# Patient Record
Sex: MALE | Race: WHITE
[De-identification: names, ages, dates, MRNs, and addresses within clinical notes are randomized per-mention and may not be internally consistent; named-entity substitution may affect disease eponyms.]

---

## 2017-06-20 ENCOUNTER — HOSPITAL ENCOUNTER (EMERGENCY)
Dept: HOSPITAL 53 - M ED | Age: 42
LOS: 1 days | Discharge: HOME | End: 2017-06-21
Payer: COMMERCIAL

## 2017-06-20 VITALS — WEIGHT: 200.62 LBS | HEIGHT: 68 IN | BODY MASS INDEX: 30.41 KG/M2

## 2017-06-20 VITALS — SYSTOLIC BLOOD PRESSURE: 146 MMHG | DIASTOLIC BLOOD PRESSURE: 98 MMHG

## 2017-06-20 DIAGNOSIS — R07.89: Primary | ICD-10-CM

## 2017-06-20 DIAGNOSIS — Z88.5: ICD-10-CM

## 2017-06-20 DIAGNOSIS — J45.909: ICD-10-CM

## 2017-06-20 LAB
ALBUMIN SERPL BCG-MCNC: 4.3 GM/DL (ref 3.2–5.2)
ALBUMIN/GLOB SERPL: 1.13 {RATIO} (ref 1–1.93)
ALP SERPL-CCNC: 88 U/L (ref 45–117)
ALT SERPL W P-5'-P-CCNC: 85 U/L (ref 12–78)
ANION GAP SERPL CALC-SCNC: 7 MEQ/L (ref 8–16)
AST SERPL-CCNC: 37 U/L (ref 15–37)
BASOPHILS # BLD AUTO: 0.1 K/MM3 (ref 0–0.2)
BASOPHILS NFR BLD AUTO: 0.8 % (ref 0–1)
BILIRUB CONJ SERPL-MCNC: 0.1 MG/DL (ref 0–0.2)
BILIRUB SERPL-MCNC: 0.4 MG/DL (ref 0.2–1)
BUN SERPL-MCNC: 21 MG/DL (ref 7–18)
CALCIUM SERPL-MCNC: 8.9 MG/DL (ref 8.5–10.1)
CHLORIDE SERPL-SCNC: 105 MEQ/L (ref 98–107)
CO2 SERPL-SCNC: 27 MEQ/L (ref 21–32)
CREAT SERPL-MCNC: 1.09 MG/DL (ref 0.7–1.3)
EOSINOPHIL # BLD AUTO: 0.4 K/MM3 (ref 0–0.5)
EOSINOPHIL NFR BLD AUTO: 5.7 % (ref 0–3)
ERYTHROCYTE [DISTWIDTH] IN BLOOD BY AUTOMATED COUNT: 12.9 % (ref 11.5–14.5)
GFR SERPL CREATININE-BSD FRML MDRD: > 60 ML/MIN/{1.73_M2} (ref 60–?)
GLUCOSE SERPL-MCNC: 118 MG/DL (ref 70–105)
LARGE UNSTAINED CELL #: 0.2 K/MM3 (ref 0–0.4)
LARGE UNSTAINED CELL %: 2.5 % (ref 0–4)
LYMPHOCYTES # BLD AUTO: 2.7 K/MM3 (ref 1.5–4.5)
LYMPHOCYTES NFR BLD AUTO: 37.5 % (ref 24–44)
MCH RBC QN AUTO: 29.3 PG (ref 27–33)
MCHC RBC AUTO-ENTMCNC: 34.1 G/DL (ref 32–36.5)
MCV RBC AUTO: 85.9 FL (ref 80–96)
MONOCYTES # BLD AUTO: 0.5 K/MM3 (ref 0–0.8)
MONOCYTES NFR BLD AUTO: 6.2 % (ref 0–5)
NEUTROPHILS # BLD AUTO: 3.5 K/MM3 (ref 1.8–7.7)
NEUTROPHILS NFR BLD AUTO: 47.3 % (ref 36–66)
PLATELET # BLD AUTO: 299 K/MM3 (ref 150–450)
POTASSIUM SERPL-SCNC: 3.8 MEQ/L (ref 3.5–5.1)
PROT SERPL-MCNC: 8.1 GM/DL (ref 6.4–8.2)
SODIUM SERPL-SCNC: 139 MEQ/L (ref 136–145)
WBC # BLD AUTO: 7.3 K/MM3 (ref 4–10)

## 2017-06-20 PROCEDURE — 71020: CPT

## 2017-06-20 PROCEDURE — 83690 ASSAY OF LIPASE: CPT

## 2017-06-20 PROCEDURE — 80076 HEPATIC FUNCTION PANEL: CPT

## 2017-06-20 PROCEDURE — 82553 CREATINE MB FRACTION: CPT

## 2017-06-20 PROCEDURE — 80048 BASIC METABOLIC PNL TOTAL CA: CPT

## 2017-06-20 PROCEDURE — 71275 CT ANGIOGRAPHY CHEST: CPT

## 2017-06-20 PROCEDURE — 85025 COMPLETE CBC W/AUTO DIFF WBC: CPT

## 2017-06-20 PROCEDURE — 94760 N-INVAS EAR/PLS OXIMETRY 1: CPT

## 2017-06-20 PROCEDURE — 93005 ELECTROCARDIOGRAM TRACING: CPT

## 2017-06-20 PROCEDURE — 99285 EMERGENCY DEPT VISIT HI MDM: CPT

## 2017-06-20 PROCEDURE — 36415 COLL VENOUS BLD VENIPUNCTURE: CPT

## 2017-06-20 PROCEDURE — 82550 ASSAY OF CK (CPK): CPT

## 2017-06-20 PROCEDURE — 93041 RHYTHM ECG TRACING: CPT

## 2017-06-20 PROCEDURE — 83880 ASSAY OF NATRIURETIC PEPTIDE: CPT

## 2017-06-20 NOTE — REPUSA
CT angiogram of the chest

Clinical statement: Chest pain and shortness of breath.

Technique: Multiple axial CT images were obtained from the thoracic inlet through the upper abdomen a
fter a bolus administration of nonionic intravenous contrast. Coronal and sagittal reconstructions we
re also obtained.

Comparison: None.

Findings: The pulmonary arteries are well-opacified with contrast, with no intraluminal filling defec
ts to suggest embolism. The thoracic aorta is unremarkable. Thyroid gland is within normal limits. Th
ere is no thoracic lymphadenopathy. There are no pericardial or pleural effusions. The lungs are mague
r. Limited imaging of the upper abdomen is unremarkable. There are no suspicious osseous lesions.

Impression: Unremarkable CT examination of the chest. No evidence of pulmonary embolism.

     Electronically signed by FOUZIA JO MD on 06/20/2017 09:50:55 PM ET

## 2017-06-21 VITALS — SYSTOLIC BLOOD PRESSURE: 121 MMHG | DIASTOLIC BLOOD PRESSURE: 78 MMHG

## 2017-06-21 NOTE — REP
Clinical:  Chest pain .

 

Comparison: 10/17/2007 .

 

Technique:  PA and lateral.

 

Findings:

The mediastinum and cardiac silhouette are normal.  The lung fields are clear and

without acute consolidation, effusion, or pneumothorax.  The skeletal structures

are intact and normal.

 

Impression:

1.   No acute cardiopulmonary process.

 

 

Signed by

Loy Gamble MD 06/21/2017 01:27 A

## 2017-06-21 NOTE — ECGEPIP
Stationary ECG Study

                           [facility] - ED

                                       

                                       Test Date:    2017

Pat Name:     ABELINO ARROYO             Department:   

Patient ID:   [id number]                 Room:         -

Gender:       M                        Technician:   missy

:          1975               Requested By: OLGA DENNIS

Order Number: [id number]     Reading MD:   Andrew Duran

                                 Measurements

Intervals                              Axis          

Rate:         55                       P:            61

ND:           174                      QRS:          58

QRSD:         116                      T:            28

QT:           421                                    

QTc:          404                                    

                           Interpretive Statements

SINUS BRADYCARDIA

INC. RBBB

PROBABLE INFERIOR MYOCARDIAL INFARCTION, PROBABLY OLD

SIMILAR TO PRIOR

Electronically Signed On 2017 11:21:50 EDT by Andrew Duran

## 2017-06-21 NOTE — ECGEPIP
Stationary ECG Study

                           [facility] - ED

                                       

                                       Test Date:    2017

Pat Name:     ABELINO ARROYO             Department:   

Patient ID:   [id number]                 Room:         -

Gender:       M                        Technician:   missy

:          1975               Requested By: IFTIKHAR SUAREZ 

Order Number: [id number]     Reading MD:   Andrew Duran

                                 Measurements

Intervals                              Axis          

Rate:         84                       P:            151

MT:           141                      QRS:          140

QRSD:         116                      T:            -26

QT:           371                                    

QTc:          440                                    

                           Interpretive Statements

SINUS RHYTHM

INCOMPLETE RIGHT BUNDLE BRANCH BLOCK

POSSIBLE RIGHT VENTRICULAR HYPERTROPHY

NO PRIORS

 

Electronically Signed On 2017 11:18:29 EDT by Andrew Duran

## 2019-03-22 ENCOUNTER — HOSPITAL ENCOUNTER (EMERGENCY)
Dept: HOSPITAL 53 - M ED | Age: 44
Discharge: HOME | End: 2019-03-22
Payer: COMMERCIAL

## 2019-03-22 VITALS — SYSTOLIC BLOOD PRESSURE: 122 MMHG | DIASTOLIC BLOOD PRESSURE: 68 MMHG

## 2019-03-22 VITALS — WEIGHT: 200.42 LBS | BODY MASS INDEX: 31.46 KG/M2 | HEIGHT: 67 IN

## 2019-03-22 DIAGNOSIS — T78.40XA: Primary | ICD-10-CM

## 2019-03-22 DIAGNOSIS — Z88.5: ICD-10-CM

## 2019-03-22 DIAGNOSIS — Z72.0: ICD-10-CM
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
## 2019-03-22 PROCEDURE — 96366 THER/PROPH/DIAG IV INF ADDON: CPT

## 2019-03-22 PROCEDURE — 99284 EMERGENCY DEPT VISIT MOD MDM: CPT

## 2019-03-22 PROCEDURE — 96365 THER/PROPH/DIAG IV INF INIT: CPT

## 2019-03-22 PROCEDURE — 96375 TX/PRO/DX INJ NEW DRUG ADDON: CPT

## 2019-03-22 PROCEDURE — 94760 N-INVAS EAR/PLS OXIMETRY 1: CPT

## 2021-03-24 ENCOUNTER — HOSPITAL ENCOUNTER (EMERGENCY)
Dept: HOSPITAL 53 - M ED | Age: 46
LOS: 1 days | Discharge: HOME | End: 2021-03-25
Payer: COMMERCIAL

## 2021-03-24 VITALS — HEIGHT: 68 IN | WEIGHT: 194.4 LBS | BODY MASS INDEX: 29.46 KG/M2

## 2021-03-24 DIAGNOSIS — L40.9: ICD-10-CM

## 2021-03-24 DIAGNOSIS — J45.909: ICD-10-CM

## 2021-03-24 DIAGNOSIS — M25.50: Primary | ICD-10-CM

## 2021-03-24 DIAGNOSIS — R53.83: ICD-10-CM

## 2021-03-24 DIAGNOSIS — M79.18: ICD-10-CM

## 2021-03-24 LAB
ALBUMIN SERPL BCG-MCNC: 3.9 GM/DL (ref 3.2–5.2)
ALT SERPL W P-5'-P-CCNC: 125 U/L (ref 12–78)
BASOPHILS # BLD AUTO: 0 10^3/UL (ref 0–0.2)
BASOPHILS NFR BLD AUTO: 0.9 % (ref 0–1)
BILIRUB CONJ SERPL-MCNC: < 0.1 MG/DL (ref 0–0.2)
BILIRUB SERPL-MCNC: 0.3 MG/DL (ref 0.2–1)
CK SERPL-CCNC: 116 U/L (ref 39–308)
EOSINOPHIL # BLD AUTO: 0 10^3/UL (ref 0–0.5)
EOSINOPHIL NFR BLD AUTO: 0.9 % (ref 0–3)
HCT VFR BLD AUTO: 46.4 % (ref 42–52)
HETEROPH AB SER QL LA: NEGATIVE
HGB BLD-MCNC: 15.7 G/DL (ref 13.5–17.5)
LIPASE SERPL-CCNC: 69 U/L (ref 73–393)
LYMPHOCYTES # BLD AUTO: 1.1 10^3/UL (ref 1.5–5)
LYMPHOCYTES NFR BLD AUTO: 22.7 % (ref 24–44)
MCH RBC QN AUTO: 28.2 PG (ref 27–33)
MCHC RBC AUTO-ENTMCNC: 33.8 G/DL (ref 32–36.5)
MCV RBC AUTO: 83.5 FL (ref 80–96)
MONOCYTES # BLD AUTO: 0.5 10^3/UL (ref 0–0.8)
MONOCYTES NFR BLD AUTO: 10.3 % (ref 2–8)
NEUTROPHILS # BLD AUTO: 3 10^3/UL (ref 1.5–8.5)
NEUTROPHILS NFR BLD AUTO: 64.8 % (ref 36–66)
PLATELET # BLD AUTO: 239 10^3/UL (ref 150–450)
PROT SERPL-MCNC: 7.4 GM/DL (ref 6.4–8.2)
RBC # BLD AUTO: 5.56 10^6/UL (ref 4.3–6.1)
RSV RNA NPH QL NAA+PROBE: NEGATIVE
WBC # BLD AUTO: 4.7 10^3/UL (ref 4–10)

## 2021-03-24 PROCEDURE — 86665 EPSTEIN-BARR CAPSID VCA: CPT

## 2021-03-24 PROCEDURE — 82140 ASSAY OF AMMONIA: CPT

## 2021-03-24 PROCEDURE — 87631 RESP VIRUS 3-5 TARGETS: CPT

## 2021-03-24 PROCEDURE — 86664 EPSTEIN-BARR NUCLEAR ANTIGEN: CPT

## 2021-03-24 PROCEDURE — 82550 ASSAY OF CK (CPK): CPT

## 2021-03-24 PROCEDURE — 71046 X-RAY EXAM CHEST 2 VIEWS: CPT

## 2021-03-24 PROCEDURE — 86617 LYME DISEASE ANTIBODY: CPT

## 2021-03-24 PROCEDURE — 99284 EMERGENCY DEPT VISIT MOD MDM: CPT

## 2021-03-24 PROCEDURE — 96374 THER/PROPH/DIAG INJ IV PUSH: CPT

## 2021-03-24 PROCEDURE — 83690 ASSAY OF LIPASE: CPT

## 2021-03-24 PROCEDURE — 96361 HYDRATE IV INFUSION ADD-ON: CPT

## 2021-03-24 PROCEDURE — 85025 COMPLETE CBC W/AUTO DIFF WBC: CPT

## 2021-03-24 PROCEDURE — 80076 HEPATIC FUNCTION PANEL: CPT

## 2021-03-24 PROCEDURE — 80047 BASIC METABLC PNL IONIZED CA: CPT

## 2021-03-24 PROCEDURE — 86308 HETEROPHILE ANTIBODY SCREEN: CPT

## 2021-03-24 NOTE — REPVR
PROCEDURE INFORMATION: 

Exam: XR Chest 

Exam date and time: 3/24/2021 10:29 PM 

Age: 45 years old 

Clinical indication: Screening exam; Other screening; Patient HX: Covid test; 

Additional info: Body aches, fever 



TECHNIQUE: 

Imaging protocol: XR of the chest 

Views: 2 views. 



COMPARISON: 

1. CR Chest, 2 view PA, Lat 2017-06-20 20:16 

2. CT ANGIO CHEST 2017-06-20 21:20 



FINDINGS: 

Lungs: Unremarkable. No consolidation. 

Pleural spaces: Unremarkable. No pleural effusion. No pneumothorax. 

Heart/Mediastinum: Unremarkable. No cardiomegaly. 

Bones/joints: Unremarkable. 



IMPRESSION: 

No acute findings. 



Electronically signed by: Caio Agarwal On 03/24/2021  23:09:21 PM

## 2021-03-25 VITALS — DIASTOLIC BLOOD PRESSURE: 78 MMHG | SYSTOLIC BLOOD PRESSURE: 135 MMHG

## 2021-03-26 LAB
B BURGDOR IGG+IGM SER-ACNC: <0.91 ISR (ref 0–0.9)
B BURGDOR IGM SER IA-ACNC: <0.8 INDEX (ref 0–0.79)
EBV NA IGG SER-ACNC: 265 U/ML (ref 0–17.9)

## 2022-10-27 ENCOUNTER — HOSPITAL ENCOUNTER (OUTPATIENT)
Dept: HOSPITAL 53 - M LAB | Age: 47
End: 2022-10-27
Attending: ALLERGY & IMMUNOLOGY
Payer: COMMERCIAL

## 2022-10-27 ENCOUNTER — HOSPITAL ENCOUNTER (OUTPATIENT)
Dept: HOSPITAL 53 - M LAB | Age: 47
End: 2022-10-27
Attending: FAMILY MEDICINE
Payer: COMMERCIAL

## 2022-10-27 DIAGNOSIS — L50.1: Primary | ICD-10-CM

## 2022-10-27 DIAGNOSIS — E78.49: ICD-10-CM

## 2022-10-27 DIAGNOSIS — R97.20: Primary | ICD-10-CM

## 2022-10-27 LAB
ALBUMIN SERPL BCG-MCNC: 3.9 GM/DL (ref 3.2–5.2)
ALBUMIN SERPL BCG-MCNC: 3.9 GM/DL (ref 3.2–5.2)
ALT SERPL W P-5'-P-CCNC: 86 U/L (ref 12–78)
ALT SERPL W P-5'-P-CCNC: 86 U/L (ref 12–78)
BASOPHILS # BLD AUTO: 0.1 10^3/UL (ref 0–0.2)
BASOPHILS NFR BLD AUTO: 0.9 % (ref 0–1)
BILIRUB SERPL-MCNC: 0.3 MG/DL (ref 0.2–1)
BILIRUB SERPL-MCNC: 0.4 MG/DL (ref 0.2–1)
BUN SERPL-MCNC: 18 MG/DL (ref 7–18)
BUN SERPL-MCNC: 18 MG/DL (ref 7–18)
C3 SERPL-MCNC: 107 MG/DL (ref 90–180)
C4 SERPL-MCNC: 25 MG/DL (ref 10–40)
CALCIUM SERPL-MCNC: 8.8 MG/DL (ref 8.5–10.1)
CALCIUM SERPL-MCNC: 8.9 MG/DL (ref 8.5–10.1)
CHLORIDE SERPL-SCNC: 105 MEQ/L (ref 98–107)
CHLORIDE SERPL-SCNC: 105 MEQ/L (ref 98–107)
CHOLEST SERPL-MCNC: 261 MG/DL (ref ?–200)
CHOLEST/HDLC SERPL: 4.14 {RATIO} (ref ?–5)
CO2 SERPL-SCNC: 26 MEQ/L (ref 21–32)
CO2 SERPL-SCNC: 27 MEQ/L (ref 21–32)
CREAT SERPL-MCNC: 0.89 MG/DL (ref 0.7–1.3)
CREAT SERPL-MCNC: 0.92 MG/DL (ref 0.7–1.3)
EOSINOPHIL # BLD AUTO: 0.5 10^3/UL (ref 0–0.5)
EOSINOPHIL NFR BLD AUTO: 7.7 % (ref 0–3)
ERYTHROCYTE [SEDIMENTATION RATE] IN BLOOD BY WESTERGREN METHOD: 5 MM/HR (ref 0–15)
GFR SERPL CREATININE-BSD FRML MDRD: > 60 ML/MIN/{1.73_M2} (ref 60–?)
GFR SERPL CREATININE-BSD FRML MDRD: > 60 ML/MIN/{1.73_M2} (ref 60–?)
GLUCOSE SERPL-MCNC: 101 MG/DL (ref 70–100)
GLUCOSE SERPL-MCNC: 103 MG/DL (ref 70–100)
HCT VFR BLD AUTO: 45.8 % (ref 42–52)
HDLC SERPL-MCNC: 63 MG/DL (ref 40–?)
HGB BLD-MCNC: 14.7 G/DL (ref 13.5–17.5)
LDLC SERPL CALC-MCNC: 179 MG/DL (ref ?–100)
LYMPHOCYTES # BLD AUTO: 1.8 10^3/UL (ref 1.5–5)
LYMPHOCYTES NFR BLD AUTO: 29.8 % (ref 24–44)
MCH RBC QN AUTO: 28.4 PG (ref 27–33)
MCHC RBC AUTO-ENTMCNC: 32.1 G/DL (ref 32–36.5)
MCV RBC AUTO: 88.6 FL (ref 80–96)
MONOCYTES # BLD AUTO: 0.5 10^3/UL (ref 0–0.8)
MONOCYTES NFR BLD AUTO: 8.8 % (ref 2–8)
NEUTROPHILS # BLD AUTO: 3.1 10^3/UL (ref 1.5–8.5)
NEUTROPHILS NFR BLD AUTO: 52.6 % (ref 36–66)
NONHDLC SERPL-MCNC: 198 MG/DL
PLATELET # BLD AUTO: 262 10^3/UL (ref 150–450)
POTASSIUM SERPL-SCNC: 4.1 MEQ/L (ref 3.5–5.1)
POTASSIUM SERPL-SCNC: 4.2 MEQ/L (ref 3.5–5.1)
PROT SERPL-MCNC: 7.5 GM/DL (ref 6.4–8.2)
PROT SERPL-MCNC: 7.5 GM/DL (ref 6.4–8.2)
PSA SERPL-MCNC: 1.61 NG/ML (ref ?–4)
RBC # BLD AUTO: 5.17 10^6/UL (ref 4.3–6.1)
RHEUMATOID FACT SERPL-ACNC: < 10 IU/ML (ref ?–15)
SODIUM SERPL-SCNC: 135 MEQ/L (ref 136–145)
SODIUM SERPL-SCNC: 137 MEQ/L (ref 136–145)
T3 SERPL-MCNC: 88.3 NG/DL (ref 60–181)
T4 FREE SERPL-MCNC: 0.85 NG/DL (ref 0.76–1.46)
THYROPEROXIDASE AB SERPL IA-ACNC: 346.1 U/ML (ref ?–60)
TRIGL SERPL-MCNC: 94 MG/DL (ref ?–150)
TSH SERPL DL<=0.005 MIU/L-ACNC: 1.7 UIU/ML (ref 0.36–3.74)
WBC # BLD AUTO: 5.9 10^3/UL (ref 4–10)

## 2022-10-28 LAB
HCT VFR BLD AUTO: 45.1 % (ref 42–52)
HGB BLD-MCNC: 14.7 G/DL (ref 13.5–17.5)
MCH RBC QN AUTO: 28.6 PG (ref 27–33)
MCHC RBC AUTO-ENTMCNC: 32.6 G/DL (ref 32–36.5)
MCV RBC AUTO: 87.7 FL (ref 80–96)
PLATELET # BLD AUTO: 274 10^3/UL (ref 150–450)
RBC # BLD AUTO: 5.14 10^6/UL (ref 4.3–6.1)
WBC # BLD AUTO: 6 10^3/UL (ref 4–10)